# Patient Record
Sex: MALE
[De-identification: names, ages, dates, MRNs, and addresses within clinical notes are randomized per-mention and may not be internally consistent; named-entity substitution may affect disease eponyms.]

---

## 2022-11-04 PROBLEM — Z00.00 ENCOUNTER FOR PREVENTIVE HEALTH EXAMINATION: Status: ACTIVE | Noted: 2022-11-04

## 2022-12-20 ENCOUNTER — NON-APPOINTMENT (OUTPATIENT)
Age: 27
End: 2022-12-20

## 2022-12-20 ENCOUNTER — APPOINTMENT (OUTPATIENT)
Dept: PLASTIC SURGERY | Facility: CLINIC | Age: 27
End: 2022-12-20
Payer: SELF-PAY

## 2022-12-20 DIAGNOSIS — Z78.9 OTHER SPECIFIED HEALTH STATUS: ICD-10-CM

## 2022-12-20 DIAGNOSIS — R68.84 JAW PAIN: ICD-10-CM

## 2022-12-20 PROCEDURE — 99203 OFFICE O/P NEW LOW 30 MIN: CPT | Mod: 95

## 2023-01-18 PROBLEM — R68.84 JAW PAIN: Status: ACTIVE | Noted: 2023-01-18

## 2023-01-26 NOTE — HISTORY OF PRESENT ILLNESS
[FreeTextEntry1] : Christopher is a 27-year-old male who reports noting some jaw soreness and facial asymmetry over the past 6 months.  He reports occasional soreness of the left side of his jaw.  Denies functional impact.  No difficulty chewing or eating.  Patient reports that the left cheek appears larger than the right and he was worried if he had some type of wisdom tooth infection or something like that so he had scheduled an appointment with an oral surgeon.  The oral surgeon ordered a CAT scan.  He did not recommend any surgical intervention but prescribed 800 mg Motrin which seemed to help the pain.  Patient reports that there is not any current pain.  But when he started noticing the pain he also noticed the asymmetry.  He is not sure if it existed before that.  Patient denies any other body or facial asymmetries.  No vision problems.  No breathing difficulties.  Patient reports that he is otherwise healthy with no significant past medical or surgical history no medications and no allergies

## 2023-01-26 NOTE — REASON FOR VISIT
[Home] : at home, [unfilled] , at the time of the visit. [Medical Office: ([facility])___] : at the medical office located in  [Patient] : the patient [Self] : self [FreeTextEntry4] : Jim Phillips

## 2023-04-25 ENCOUNTER — APPOINTMENT (OUTPATIENT)
Dept: PLASTIC SURGERY | Facility: CLINIC | Age: 28
End: 2023-04-25

## 2023-04-27 ENCOUNTER — APPOINTMENT (OUTPATIENT)
Dept: PLASTIC SURGERY | Facility: CLINIC | Age: 28
End: 2023-04-27

## 2023-06-06 ENCOUNTER — APPOINTMENT (OUTPATIENT)
Dept: PLASTIC SURGERY | Facility: CLINIC | Age: 28
End: 2023-06-06
Payer: SELF-PAY

## 2023-06-06 DIAGNOSIS — M95.2 OTHER ACQUIRED DEFORMITY OF HEAD: ICD-10-CM

## 2023-06-06 PROCEDURE — 99213 OFFICE O/P EST LOW 20 MIN: CPT | Mod: 95

## 2023-09-12 ENCOUNTER — APPOINTMENT (OUTPATIENT)
Dept: PLASTIC SURGERY | Facility: CLINIC | Age: 28
End: 2023-09-12

## 2024-07-09 ENCOUNTER — APPOINTMENT (OUTPATIENT)
Dept: PLASTIC SURGERY | Facility: CLINIC | Age: 29
End: 2024-07-09